# Patient Record
Sex: FEMALE | Race: OTHER | Employment: UNEMPLOYED | URBAN - METROPOLITAN AREA
[De-identification: names, ages, dates, MRNs, and addresses within clinical notes are randomized per-mention and may not be internally consistent; named-entity substitution may affect disease eponyms.]

---
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
## 2022-07-17 ENCOUNTER — HOSPITAL ENCOUNTER (EMERGENCY)
Facility: HOSPITAL | Age: 22
Discharge: HOME | End: 2022-07-17
Attending: EMERGENCY MEDICINE
Payer: MEDICAID

## 2022-07-17 VITALS
OXYGEN SATURATION: 99 % | HEART RATE: 89 BPM | SYSTOLIC BLOOD PRESSURE: 151 MMHG | TEMPERATURE: 98 F | DIASTOLIC BLOOD PRESSURE: 90 MMHG | RESPIRATION RATE: 16 BRPM | WEIGHT: 175 LBS | BODY MASS INDEX: 33 KG/M2

## 2022-07-17 DIAGNOSIS — H60.502 ACUTE OTITIS EXTERNA OF LEFT EAR, UNSPECIFIED TYPE: Primary | ICD-10-CM

## 2022-07-17 PROCEDURE — 99283 EMERGENCY DEPT VISIT LOW MDM: CPT

## 2022-07-17 RX ORDER — NEOMYCIN SULFATE, POLYMYXIN B SULFATE AND HYDROCORTISONE 10; 3.5; 1 MG/ML; MG/ML; [USP'U]/ML
4 SUSPENSION/ DROPS AURICULAR (OTIC) 4 TIMES DAILY
Qty: 10 ML | Refills: 0 | Status: SHIPPED | OUTPATIENT
Start: 2022-07-17

## 2022-07-17 RX ORDER — CEPHALEXIN 500 MG/1
500 CAPSULE ORAL 3 TIMES DAILY
Qty: 15 CAPSULE | Refills: 0 | Status: SHIPPED | OUTPATIENT
Start: 2022-07-17 | End: 2022-07-22

## 2022-07-20 ENCOUNTER — OFFICE VISIT (OUTPATIENT)
Dept: FAMILY MEDICINE CLINIC | Facility: CLINIC | Age: 22
End: 2022-07-20
Payer: MEDICAID

## 2022-07-20 VITALS
BODY MASS INDEX: 36.25 KG/M2 | HEIGHT: 61 IN | WEIGHT: 192 LBS | DIASTOLIC BLOOD PRESSURE: 77 MMHG | TEMPERATURE: 97 F | HEART RATE: 75 BPM | SYSTOLIC BLOOD PRESSURE: 125 MMHG

## 2022-07-20 DIAGNOSIS — H60.502 ACUTE OTITIS EXTERNA OF LEFT EAR, UNSPECIFIED TYPE: Primary | ICD-10-CM

## 2022-07-20 PROCEDURE — 3074F SYST BP LT 130 MM HG: CPT | Performed by: NURSE PRACTITIONER

## 2022-07-20 PROCEDURE — 3078F DIAST BP <80 MM HG: CPT | Performed by: NURSE PRACTITIONER

## 2022-07-20 PROCEDURE — 99213 OFFICE O/P EST LOW 20 MIN: CPT | Performed by: NURSE PRACTITIONER

## 2022-07-20 PROCEDURE — 3008F BODY MASS INDEX DOCD: CPT | Performed by: NURSE PRACTITIONER

## 2023-04-13 ENCOUNTER — OFFICE VISIT (OUTPATIENT)
Dept: FAMILY MEDICINE CLINIC | Facility: CLINIC | Age: 23
End: 2023-04-13

## 2023-04-13 VITALS
HEIGHT: 61 IN | BODY MASS INDEX: 40.22 KG/M2 | SYSTOLIC BLOOD PRESSURE: 133 MMHG | WEIGHT: 213 LBS | DIASTOLIC BLOOD PRESSURE: 77 MMHG | HEART RATE: 80 BPM

## 2023-04-13 DIAGNOSIS — N89.8 VAGINAL DISCHARGE: Primary | ICD-10-CM

## 2023-04-13 PROCEDURE — 3075F SYST BP GE 130 - 139MM HG: CPT | Performed by: NURSE PRACTITIONER

## 2023-04-13 PROCEDURE — 99213 OFFICE O/P EST LOW 20 MIN: CPT | Performed by: NURSE PRACTITIONER

## 2023-04-13 PROCEDURE — 3078F DIAST BP <80 MM HG: CPT | Performed by: NURSE PRACTITIONER

## 2023-04-13 PROCEDURE — 3008F BODY MASS INDEX DOCD: CPT | Performed by: NURSE PRACTITIONER

## 2023-04-14 LAB
C TRACH DNA SPEC QL NAA+PROBE: NEGATIVE
N GONORRHOEA DNA SPEC QL NAA+PROBE: NEGATIVE

## 2023-04-15 LAB
GENITAL VAGINOSIS SCREEN: NEGATIVE
TRICHOMONAS SCREEN: NEGATIVE

## 2024-07-30 ENCOUNTER — HOSPITAL ENCOUNTER (OUTPATIENT)
Age: 24
Discharge: HOME | End: 2024-07-30
Payer: MEDICAID

## 2024-07-30 VITALS
TEMPERATURE: 98 F | DIASTOLIC BLOOD PRESSURE: 60 MMHG | OXYGEN SATURATION: 98 % | HEART RATE: 78 BPM | SYSTOLIC BLOOD PRESSURE: 130 MMHG | RESPIRATION RATE: 18 BRPM

## 2024-07-30 DIAGNOSIS — S09.22XA: Primary | ICD-10-CM

## 2024-07-30 PROCEDURE — 99213 OFFICE O/P EST LOW 20 MIN: CPT | Performed by: NURSE PRACTITIONER

## 2024-07-30 RX ORDER — AMOXICILLIN AND CLAVULANATE POTASSIUM 875; 125 MG/1; MG/1
1 TABLET, FILM COATED ORAL 2 TIMES DAILY
Qty: 20 TABLET | Refills: 0 | Status: SHIPPED | OUTPATIENT
Start: 2024-07-30 | End: 2024-08-09

## 2024-07-30 RX ORDER — CIPROFLOXACIN AND DEXAMETHASONE 3; 1 MG/ML; MG/ML
4 SUSPENSION/ DROPS AURICULAR (OTIC) 2 TIMES DAILY
Qty: 7.5 ML | Refills: 0 | Status: SHIPPED | OUTPATIENT
Start: 2024-07-30 | End: 2024-08-09

## 2024-07-30 NOTE — DISCHARGE INSTRUCTIONS
As discussed, oral antibiotics twice a day for 10 days.  Antibiotic eardrops twice a day for 10 days.  Sleep somewhat elevated and upright.  Tylenol every 4 hours Motrin every 6 hours as needed for discomfort.  Avoid getting water in the ear: No tub baths or swimming.  Please follow-up with ENT specialist.

## 2024-07-30 NOTE — ED PROVIDER NOTES
Patient Seen in: [facility]      History     Chief Complaint   Patient presents with    Ear Pain     Stated Complaint: Ear Pain    Subjective: This is a 24-year-old female, no significant past medical history, [place]ian-speaking only, presents to immediate care clinic for left ear pain for 1 week.  Pain has become more severe and constant in nature.  Denies any trauma to ear.  No drainage or discharge.  Positive muffled hearing.  No recent or current viral symptoms.  No recent swimming.  No recent airplane travel.  No fever, chills, fatigue.  Well-appearing.  AOx4.  The history is provided by the patient. The history is limited by a language barrier. A  was used (465129).           Objective:   No pertinent past medical history.            No pertinent past surgical history.              No pertinent social history.            Review of Systems   Constitutional: Negative.  Negative for activity change, appetite change, chills, fatigue and fever.   HENT:  Positive for ear pain and hearing loss. Negative for congestion, ear discharge, postnasal drip, rhinorrhea, sinus pressure, sinus pain, sneezing, sore throat, tinnitus, trouble swallowing and voice change.    Respiratory: Negative.  Negative for cough and shortness of breath.    Cardiovascular: Negative.    Musculoskeletal: Negative.    Skin: Negative.    Neurological: Negative.  Negative for dizziness, weakness, light-headedness and headaches.       Positive for stated Chief Complaint: Ear Pain    Other systems are as noted in HPI.  Constitutional and vital signs reviewed.      All other systems reviewed and negative except as noted above.    Physical Exam     ED Triage Vitals [07/30/24 1038]   /60   Pulse 78   Resp 18   Temp 97.8 °F (36.6 °C)   Temp src Temporal   SpO2 98 %   O2 Device None (Room air)       Current Vitals:   Vital Signs  BP: 130/60  Pulse: 78  Resp: 18  Temp: 97.8 °F (36.6 °C)  Temp src: Temporal    Oxygen  Therapy  SpO2: 98 %  O2 Device: None (Room air)            Physical Exam  Constitutional:       General: She is not in acute distress.     Appearance: Normal appearance. She is not ill-appearing.   HENT:      Head: Normocephalic.      Right Ear: Tympanic membrane, ear canal and external ear normal.      Left Ear: Drainage and tenderness present. Tympanic membrane is perforated.      Ears:        Nose: Nose normal.      Mouth/Throat:      Mouth: Mucous membranes are moist.      Pharynx: Oropharynx is clear. No oropharyngeal exudate or posterior oropharyngeal erythema.   Eyes:      Extraocular Movements: Extraocular movements intact.      Pupils: Pupils are equal, round, and reactive to light.   Cardiovascular:      Rate and Rhythm: Normal rate and regular rhythm.      Pulses: Normal pulses.      Heart sounds: Normal heart sounds.   Pulmonary:      Effort: Pulmonary effort is normal. No respiratory distress.      Breath sounds: Normal breath sounds. No stridor. No wheezing, rhonchi or rales.   Chest:      Chest wall: No tenderness.   Musculoskeletal:         General: Normal range of motion.      Cervical back: Normal range of motion.   Lymphadenopathy:      Cervical: No cervical adenopathy.   Skin:     General: Skin is warm.      Capillary Refill: Capillary refill takes less than 2 seconds.   Neurological:      General: No focal deficit present.      Mental Status: She is alert and oriented to person, place, and time.               ED Course   Labs Reviewed - No data to display                   MDM      Differentials considered include: AOM, OME, cerumen impaction, foreign body, perforated or ruptured tympanic membrane.    Right TM visualized.  Good landmarks and light reflex.  No erythema, bulging, perforation, retraction.  No cerumen impaction.  No foreign body.  No effusion.    Left TM with perforation, positive drainage and discharge.  Unable to discernibly see exactly where perforation located, suspected at  between 7 and 12:00 margins.  Positive tenderness with exam.  Will prescribe both oral and antibiotic eardrops.    Discharge instructions given using .  Patient is aware to sleep somewhat elevated upright.  She is aware to avoid getting water in ear.  Tylenol and Motrin as needed for discomfort.    Patient is aware to follow-up with ENT specialist.  She verbalized understanding agrees with plan of care.                                   Medical Decision Making      Disposition and Plan     Clinical Impression:  1. Puncture wound of tympanic membrane, left, initial encounter         Disposition:  Discharge  7/30/2024 10:59 am    Follow-up:  Shiela Douglas APRN  [address]  [facility] [id number] [phone]      As needed    Shilo Conley MD  [address]  [facility] [id number] [phone]    Schedule an appointment as soon as possible for a visit   This is an Ear specialist you should follow up with          Medications Prescribed:  Discharge Medication List as of 7/30/2024 11:01 AM        START taking these medications    Details   amoxicillin clavulanate 875-125 MG Oral Tab Take 1 tablet by mouth 2 (two) times daily for 10 days., Normal, Disp-20 tablet, R-0      ciprofloxacin-dexamethasone 0.3-0.1 % Otic Suspension Place 4 drops into the left ear 2 (two) times daily for 10 days., Normal, Disp-7.5 mL, R-0

## 2024-08-15 ENCOUNTER — OFFICE VISIT (OUTPATIENT)
Dept: OTOLARYNGOLOGY | Facility: CLINIC | Age: 24
End: 2024-08-15

## 2024-08-15 DIAGNOSIS — H60.503 ACUTE OTITIS EXTERNA OF BOTH EARS, UNSPECIFIED TYPE: Primary | ICD-10-CM

## 2024-08-15 PROCEDURE — 69210 REMOVE IMPACTED EAR WAX UNI: CPT | Performed by: STUDENT IN AN ORGANIZED HEALTH CARE EDUCATION/TRAINING PROGRAM

## 2024-08-15 PROCEDURE — 99203 OFFICE O/P NEW LOW 30 MIN: CPT | Performed by: STUDENT IN AN ORGANIZED HEALTH CARE EDUCATION/TRAINING PROGRAM

## 2024-08-15 RX ORDER — ACETIC ACID 20.65 MG/ML
4 SOLUTION AURICULAR (OTIC) 3 TIMES DAILY
Qty: 1 EACH | Refills: 0 | Status: SHIPPED | OUTPATIENT
Start: 2024-08-15 | End: 2024-08-22

## 2024-08-15 NOTE — PROGRESS NOTES
Olive Vaughn is a 24 year old female.   Chief Complaint   Patient presents with    Ear Problem     Patient is here for last week ear pain patitn reports itchy right now patient reports ear clogged.     HPI:   24-year-old presents with bilateral ear itching and drainage and hearing loss    Current Outpatient Medications   Medication Sig Dispense Refill    acetic acid 2 % Otic Solution Place 4 drops into both ears 3 (three) times daily for 7 days. 1 each 0      History reviewed. No pertinent past medical history.   Social History:  Social History     Socioeconomic History    Marital status: Single   Tobacco Use    Smoking status: Never    Smokeless tobacco: Never   Vaping Use    Vaping status: Never Used   Substance and Sexual Activity    Alcohol use: Never    Drug use: Never     Social Determinants of Health      Received from [facility]      History reviewed. No pertinent surgical history.      EXAM:   There were no vitals taken for this visit.    System Details   Skin Inspection - Normal.   Constitutional Overall appearance - Normal.   Head/Face Symmetric, TMJ tenderness not present    Eyes EOMI, PERRL   Right ear:  Canal with gross fungal elements and ear canal edema   Left ear:  Canal with gross fungal elements and ear canal edema   Nose: Septum midline, inferior turbinates not enlarged, nasal valves without collapse    Oral cavity/Oropharynx: No lesions or masses on inspection or palpation, tonsils symmetric    Neck: Soft without LAD, thyroid not enlarged  Voice clear/ no stridor   Other:      SCOPES AND PROCEDURES:     Canals:  Left: Canal with cerumen preventing adequate view of TM, debrided with instrumentation  Right: Canal with cerumen preventing adequate view of TM, debrided with instrumentation    Tympanic Membranes:  Left: Normal tympanic membrane.   Right: Normal tympanic membrane.     TM Visualized Method:   Left TM examined via otomicroscopy.    Right TM examined via  otomicroscopy.      PROCEDURE:   Removal of cerumen impaction   The cerumen impaction was completely removed on the left and right sides using microscopy as necessary.   Removal was completed by using a curette and suction.     AUDIOGRAM AND IMAGING:         IMPRESSION:   1. Acute otitis externa of both ears, unspecified type       Recommendations:  -24-year-old with a bilateral fungal otitis externa  -Ears were debrided today  -She will begin acetic acid otic drops to both ears and discussed use these medications  -Dry ear precautions discussed and like to see her next week to assure resolution    The patient indicates understanding of these issues and agrees to the plan.  Consult from Dr Zuluaga regarding ear evaluation    Shilo Conley MD  8/15/2024  1:52 PM

## 2024-08-29 ENCOUNTER — OFFICE VISIT (OUTPATIENT)
Dept: OTOLARYNGOLOGY | Facility: CLINIC | Age: 24
End: 2024-08-29

## 2024-08-29 DIAGNOSIS — H61.23 BILATERAL IMPACTED CERUMEN: ICD-10-CM

## 2024-08-29 DIAGNOSIS — H60.503 ACUTE OTITIS EXTERNA OF BOTH EARS, UNSPECIFIED TYPE: Primary | ICD-10-CM

## 2024-08-29 PROCEDURE — 69210 REMOVE IMPACTED EAR WAX UNI: CPT | Performed by: STUDENT IN AN ORGANIZED HEALTH CARE EDUCATION/TRAINING PROGRAM

## 2024-08-29 PROCEDURE — 99213 OFFICE O/P EST LOW 20 MIN: CPT | Performed by: STUDENT IN AN ORGANIZED HEALTH CARE EDUCATION/TRAINING PROGRAM

## 2024-08-29 NOTE — PROGRESS NOTES
Olive Vaughn is a 24 year old female.   Chief Complaint   Patient presents with    Follow - Up     F/up acute otitis externa of both ears  Pt has been taking ear drops     HPI:   24-year-old in follow-up regarding her bilateral fungal otitis externa.  Feeling much better    No current outpatient medications on file.      History reviewed. No pertinent past medical history.   Social History:  Social History     Socioeconomic History    Marital status: Single   Tobacco Use    Smoking status: Never    Smokeless tobacco: Never   Vaping Use    Vaping status: Never Used   Substance and Sexual Activity    Alcohol use: Never    Drug use: Never     Social Determinants of Health      [facility]      History reviewed. No pertinent surgical history.      EXAM:   There were no vitals taken for this visit.    System Details   Skin Inspection - Normal.   Constitutional Overall appearance - Normal.   Head/Face Symmetric, TMJ tenderness not present    Eyes EOMI, PERRL   Right ear:  Canal wet cerumen preventing view of the tympanic membrane, TM intact, no MORGAN   Left ear:  Canal wet cerumen preventing view of the tympanic membrane, TM intact, no MORGAN   Nose: Septum midline, inferior turbinates not enlarged, nasal valves without collapse    Oral cavity/Oropharynx: No lesions or masses on inspection or palpation, tonsils symmetric    Neck: Soft without LAD, thyroid not enlarged  Voice clear/ no stridor   Other:      SCOPES AND PROCEDURES:     Canals:  Left: Canal with wet cerumen preventing adequate view of TM, debrided with instrumentation  Right: Canal with wet cerumen preventing adequate view of TM, debrided with instrumentation    Tympanic Membranes:  Left: Normal tympanic membrane.   Right: Normal tympanic membrane.     TM Visualized Method:   Left TM examined via otomicroscopy.    Right TM examined via otomicroscopy.      PROCEDURE:   Removal of cerumen impaction   The cerumen impaction  was completely removed on the left and right sides using microscopy as necessary.   Removal was completed by using a curette and suction.     AUDIOGRAM AND IMAGING:         IMPRESSION:   1. Acute otitis externa of both ears, unspecified type    2. Bilateral impacted cerumen       Recommendations:  -Improved bilaterally.  Residual wet cerumen debrided.  -Can discontinue otic drops and let her ears dry out  -Return precautions discussed    The patient indicates understanding of these issues and agrees to the plan.      Shilo Conley MD  8/29/2024  1:09 PM

## 2024-10-21 NOTE — PROGRESS NOTES
Subjective:   Olive Vaughn is a 24 year old female who presents for Physical (Lab order, (Accepted Influenza Vaccine Today))   Patient is a pleasant 24-year-old female with no significant past medical history.  Patient presents to office today new to this provider to obtain physical.  Patient states via [facility] [id number] her health is ok.     Diet: Not good. Wants to eat more vegetables. She eats out on occasion.   Exercise: she does not exercise. Educated on recommendations.  Sleep: can be an issue. She works nights, she sleeps well during the day. However, she tries to sleep at night on weekends.   Stress: she can get stresses at times. Likes to listen to music.   Social: dating boys, has 2 kids, lives in [place], rents home  Sexually Active: yes  Prophylaxis: not right now, educated at risk for pregnancy. LMP 10/18/24.   Alcohol: on occasion  Tobacco: no  Work: works at [employer]   Vaccinations: N/A  PAP: needs to schedule            History reviewed. No pertinent past medical history.   History reviewed. No pertinent surgical history.     History/Other:    Chief Complaint Reviewed and Verified  Nursing Notes Reviewed and   Verified  Tobacco Reviewed  Allergies Reviewed  Medications Reviewed    Problem List Reviewed  Medical History Reviewed  Surgical History   Reviewed  OB Status Reviewed  Family History Reviewed  Social History   Reviewed         Tobacco:  She has never smoked tobacco.    Current Outpatient Medications   Medication Sig Dispense Refill    naproxen 250 MG Oral Tab Take 1 tablet (250 mg total) by mouth 2 (two) times daily with meals. 30 tablet 0    clotrimazole 1 % External Cream Apply 1 Application topically 2 (two) times daily for 14 days. 60 g 0         Review of Systems:  Review of Systems   Constitutional: Negative.  Negative for activity change, appetite change, chills and fever.   HENT: Negative.  Negative for congestion, postnasal drip, rhinorrhea, sore  throat, tinnitus and voice change.         Narrow oropharynx   Eyes: Negative.    Respiratory: Negative.  Negative for apnea, cough, chest tightness and shortness of breath.    Cardiovascular:  Negative for chest pain and leg swelling.   Gastrointestinal: Negative.  Negative for abdominal pain, anal bleeding, blood in stool, constipation, diarrhea, nausea and vomiting.   Genitourinary: Negative.  Negative for difficulty urinating, flank pain and menstrual problem.   Musculoskeletal: Negative.  Negative for joint swelling.   Skin: Negative.    Neurological: Negative.  Negative for dizziness and headaches.   Psychiatric/Behavioral: Negative.  Negative for agitation.          Objective:   /68 (BP Location: Left arm, Patient Position: Sitting, Cuff Size: large)   Pulse 75   Ht 5' 1\" (1.549 m)   Wt 232 lb 6.4 oz (105.4 kg)   LMP 10/18/2024   SpO2 98%   BMI 43.91 kg/m²  Estimated body mass index is 43.91 kg/m² as calculated from the following:    Height as of this encounter: 5' 1\" (1.549 m).    Weight as of this encounter: 232 lb 6.4 oz (105.4 kg).  Physical Exam  Vitals and nursing note reviewed.   Constitutional:       General: She is not in acute distress.     Appearance: Normal appearance. She is well-developed. She is obese.   HENT:      Head: Normocephalic and atraumatic.      Right Ear: Tympanic membrane, ear canal and external ear normal.      Left Ear: Tympanic membrane, ear canal and external ear normal.      Nose: Nose normal. No congestion or rhinorrhea.      Mouth/Throat:      Mouth: Mucous membranes are moist.      Pharynx: Oropharynx is clear.   Eyes:      Conjunctiva/sclera: Conjunctivae normal.      Pupils: Pupils are equal, round, and reactive to light.   Neck:      Thyroid: No thyromegaly.   Cardiovascular:      Rate and Rhythm: Normal rate and regular rhythm.      Pulses: Normal pulses.      Heart sounds: Normal heart sounds. No murmur heard.  Pulmonary:      Effort: Pulmonary effort is  normal. No respiratory distress.      Breath sounds: Normal breath sounds. No wheezing or rales.   Chest:      Chest wall: No tenderness.   Abdominal:      General: Bowel sounds are normal. There is no distension.      Palpations: Abdomen is soft.      Tenderness: There is no abdominal tenderness.   Musculoskeletal:         General: No tenderness. Normal range of motion.      Cervical back: Normal range of motion and neck supple.   Feet:      Comments: Tinea pedis right 4-5 digit  Lymphadenopathy:      Cervical: No cervical adenopathy.   Skin:     General: Skin is warm and dry.      Capillary Refill: Capillary refill takes less than 2 seconds.      Findings: No rash.   Neurological:      Mental Status: She is alert and oriented to person, place, and time.      Coordination: Coordination normal.   Psychiatric:         Behavior: Behavior normal.         Thought Content: Thought content normal.         Judgment: Judgment normal.           Assessment & Plan:   1. Encounter for wellness examination in adult (Primary)  -     Hemoglobin A1C; Future; Expected date: 10/22/2024  -     CBC With Differential With Platelet; Future; Expected date: 10/22/2024  -     Comp Metabolic Panel (14); Future; Expected date: 10/22/2024  -     TSH W Reflex To Free T4; Future; Expected date: 10/22/2024  -     Lipid Panel; Future; Expected date: 10/22/2024  2. Need for immunization against influenza  -     Fluzone trivalent vaccine, PF 0.5mL, 6mo+ (87007)  3. Obesity, unspecified class, unspecified obesity type, unspecified whether serious comorbidity present  -     Diagnostic Sleep Study  4. MARK (obstructive sleep apnea)  -     Diagnostic Sleep Study  -     General sleep study; Future; Expected date: 11/22/2024  5. Tinea pedis of right foot  -     Clotrimazole; Apply 1 Application topically 2 (two) times daily for 14 days.  Dispense: 60 g; Refill: 0  6. Other migraine without status migrainosus, not intractable  -     Naproxen; Take 1 tablet  (250 mg total) by mouth 2 (two) times daily with meals.  Dispense: 30 tablet; Refill: 0    1. Encounter for wellness examination in adult  Wellness labs ordered.  Encouraged increasing physical activity which includes raising heart rate 3 to 5 days/week for at least 30 minutes at a time, while also performing mild strength exercises.  Patient counseled on importance of dietary modifications, which may include limiting fats, red meat, and carbohydrates, while increasing fruit and vegetable intake, and trying to adhere to a low sodium/Mediterranean diet.  Encouraged to manage stress.  Encouraged good sleep habits.  Encouraged good sexual health.    - Hemoglobin A1C; Future  - CBC With Differential With Platelet; Future  - Comp Metabolic Panel (14); Future  - TSH W Reflex To Free T4; Future  - Lipid Panel; Future    2. Need for immunization against influenza  Flu vaccine in office  - Fluzone trivalent vaccine, PF 0.5mL, 6mo+ (63381)    3. Obesity, unspecified class, unspecified obesity type, unspecified whether serious comorbidity present  BMI in office 43.91  Check lipids  Check A1c  Encourage lifestyle changes  Educated on recommendation on exercise  - DIAGOSTIC SLEEP STUDY    4. MARK (obstructive sleep apnea)  STOPBANG score 5  Reported period of apnea  - DIAGOSTIC SLEEP STUDY  - General sleep study; Future    5. Tinea pedis of right foot  Practices aimed at minimizing moisture and friction in the involved area and reducing susceptibility to intertrigo are the mainstays of treatment. Typical beneficial practices include:  -Daily cleansing of skin with a mild cleanser followed by drying of area completely  -Aeration of affected area when feasible  -Daily application of drying powders, such as powders composed of microporous cellulose  -Use of absorbent material or clothing, such as cotton or alfaro wool, to separate skin in folds  -Weight loss in persons who are overweight or obese  -Use Topical cream as prescribed.      - clotrimazole 1 % External Cream; Apply 1 Application topically 2 (two) times daily for 14 days.  Dispense: 60 g; Refill: 0    6. Other migraine without status migrainosus, not intractable  Unilateral headache  Pulsatile  Recurrent   Associated nausea  Prefers to rest  Start naproxen BID PRN  Edcuated HA diary and prevention techniques at home  Can escalate therapy if needed  - naproxen 250 MG Oral Tab; Take 1 tablet (250 mg total) by mouth 2 (two) times daily with meals.  Dispense: 30 tablet; Refill: 0    Patient aware of plan of care. All questions answered to satisfaction of the patient. Patient instructed to call office or reach out via [employer] if any issues arise. For urgent issues and/or reviewed red flags please proceed to the urgent care or ER.  Also, inform the nurse practitioner with any new symptoms or medication side effects.        Return if symptoms worsen or fail to improve.    Eduard Romo, APRN, 10/21/2024, 1:17 PM

## 2024-10-22 ENCOUNTER — LAB ENCOUNTER (OUTPATIENT)
Dept: LAB | Age: 24
End: 2024-10-22
Payer: MEDICAID

## 2024-10-22 ENCOUNTER — MED REC SCAN ONLY (OUTPATIENT)
Dept: FAMILY MEDICINE CLINIC | Facility: CLINIC | Age: 24
End: 2024-10-22

## 2024-10-22 ENCOUNTER — OFFICE VISIT (OUTPATIENT)
Dept: FAMILY MEDICINE CLINIC | Facility: CLINIC | Age: 24
End: 2024-10-22

## 2024-10-22 VITALS
OXYGEN SATURATION: 98 % | BODY MASS INDEX: 43.88 KG/M2 | SYSTOLIC BLOOD PRESSURE: 120 MMHG | WEIGHT: 232.38 LBS | HEART RATE: 75 BPM | HEIGHT: 61 IN | DIASTOLIC BLOOD PRESSURE: 68 MMHG

## 2024-10-22 DIAGNOSIS — G43.809 OTHER MIGRAINE WITHOUT STATUS MIGRAINOSUS, NOT INTRACTABLE: ICD-10-CM

## 2024-10-22 DIAGNOSIS — Z23 NEED FOR IMMUNIZATION AGAINST INFLUENZA: ICD-10-CM

## 2024-10-22 DIAGNOSIS — Z00.00 ENCOUNTER FOR WELLNESS EXAMINATION IN ADULT: Primary | ICD-10-CM

## 2024-10-22 DIAGNOSIS — G47.33 OSA (OBSTRUCTIVE SLEEP APNEA): ICD-10-CM

## 2024-10-22 DIAGNOSIS — E66.9 OBESITY, UNSPECIFIED CLASS, UNSPECIFIED OBESITY TYPE, UNSPECIFIED WHETHER SERIOUS COMORBIDITY PRESENT: ICD-10-CM

## 2024-10-22 DIAGNOSIS — Z00.00 ENCOUNTER FOR WELLNESS EXAMINATION IN ADULT: ICD-10-CM

## 2024-10-22 DIAGNOSIS — B35.3 TINEA PEDIS OF RIGHT FOOT: ICD-10-CM

## 2024-10-22 PROBLEM — A74.9 CHLAMYDIA: Status: ACTIVE | Noted: 2019-12-10

## 2024-10-22 LAB
ALBUMIN SERPL-MCNC: 4.5 G/DL (ref 3.2–4.8)
ALBUMIN/GLOB SERPL: 1.5 {RATIO} (ref 1–2)
ALP LIVER SERPL-CCNC: 82 U/L
ALT SERPL-CCNC: 86 U/L
ANION GAP SERPL CALC-SCNC: 5 MMOL/L (ref 0–18)
AST SERPL-CCNC: 37 U/L (ref ?–34)
BASOPHILS # BLD AUTO: 0.04 X10(3) UL (ref 0–0.2)
BASOPHILS NFR BLD AUTO: 0.4 %
BILIRUB SERPL-MCNC: 0.6 MG/DL (ref 0.3–1.2)
BUN BLD-MCNC: 16 MG/DL (ref 9–23)
BUN/CREAT SERPL: 26.2 (ref 10–20)
CALCIUM BLD-MCNC: 9.6 MG/DL (ref 8.7–10.4)
CHLORIDE SERPL-SCNC: 109 MMOL/L (ref 98–112)
CHOLEST SERPL-MCNC: 169 MG/DL (ref ?–200)
CO2 SERPL-SCNC: 26 MMOL/L (ref 21–32)
CREAT BLD-MCNC: 0.61 MG/DL
DEPRECATED RDW RBC AUTO: 42.9 FL (ref 35.1–46.3)
EGFRCR SERPLBLD CKD-EPI 2021: 130 ML/MIN/1.73M2 (ref 60–?)
EOSINOPHIL # BLD AUTO: 0.25 X10(3) UL (ref 0–0.7)
EOSINOPHIL NFR BLD AUTO: 2.6 %
ERYTHROCYTE [DISTWIDTH] IN BLOOD BY AUTOMATED COUNT: 14.1 % (ref 11–15)
FASTING PATIENT LIPID ANSWER: YES
FASTING STATUS PATIENT QL REPORTED: YES
GLOBULIN PLAS-MCNC: 3.1 G/DL (ref 2–3.5)
GLUCOSE BLD-MCNC: 108 MG/DL (ref 70–99)
HCT VFR BLD AUTO: 35.6 %
HDLC SERPL-MCNC: 36 MG/DL (ref 40–59)
HGB BLD-MCNC: 12 G/DL
IMM GRANULOCYTES # BLD AUTO: 0.02 X10(3) UL (ref 0–1)
IMM GRANULOCYTES NFR BLD: 0.2 %
LDLC SERPL CALC-MCNC: 99 MG/DL (ref ?–100)
LYMPHOCYTES # BLD AUTO: 2.44 X10(3) UL (ref 1–4)
LYMPHOCYTES NFR BLD AUTO: 25.7 %
MCH RBC QN AUTO: 28.2 PG (ref 26–34)
MCHC RBC AUTO-ENTMCNC: 33.7 G/DL (ref 31–37)
MCV RBC AUTO: 83.6 FL
MONOCYTES # BLD AUTO: 0.71 X10(3) UL (ref 0.1–1)
MONOCYTES NFR BLD AUTO: 7.5 %
NEUTROPHILS # BLD AUTO: 6.03 X10 (3) UL (ref 1.5–7.7)
NEUTROPHILS # BLD AUTO: 6.03 X10(3) UL (ref 1.5–7.7)
NEUTROPHILS NFR BLD AUTO: 63.6 %
NONHDLC SERPL-MCNC: 133 MG/DL (ref ?–130)
OSMOLALITY SERPL CALC.SUM OF ELEC: 292 MOSM/KG (ref 275–295)
PLATELET # BLD AUTO: 351 10(3)UL (ref 150–450)
POTASSIUM SERPL-SCNC: 4 MMOL/L (ref 3.5–5.1)
PROT SERPL-MCNC: 7.6 G/DL (ref 5.7–8.2)
RBC # BLD AUTO: 4.26 X10(6)UL
SODIUM SERPL-SCNC: 140 MMOL/L (ref 136–145)
TRIGL SERPL-MCNC: 196 MG/DL (ref 30–149)
TSI SER-ACNC: 2.03 MIU/ML (ref 0.55–4.78)
VLDLC SERPL CALC-MCNC: 33 MG/DL (ref 0–30)
WBC # BLD AUTO: 9.5 X10(3) UL (ref 4–11)

## 2024-10-22 PROCEDURE — 36415 COLL VENOUS BLD VENIPUNCTURE: CPT

## 2024-10-22 PROCEDURE — 85025 COMPLETE CBC W/AUTO DIFF WBC: CPT

## 2024-10-22 PROCEDURE — 80061 LIPID PANEL: CPT

## 2024-10-22 PROCEDURE — 80053 COMPREHEN METABOLIC PANEL: CPT

## 2024-10-22 PROCEDURE — 83036 HEMOGLOBIN GLYCOSYLATED A1C: CPT

## 2024-10-22 PROCEDURE — 90471 IMMUNIZATION ADMIN: CPT

## 2024-10-22 PROCEDURE — 90656 IIV3 VACC NO PRSV 0.5 ML IM: CPT

## 2024-10-22 PROCEDURE — 99395 PREV VISIT EST AGE 18-39: CPT

## 2024-10-22 PROCEDURE — 84443 ASSAY THYROID STIM HORMONE: CPT

## 2024-10-22 RX ORDER — NAPROXEN 250 MG/1
250 TABLET ORAL EVERY 12 HOURS PRN
Qty: 30 TABLET | Refills: 0 | Status: SHIPPED | OUTPATIENT
Start: 2024-10-22

## 2024-10-22 RX ORDER — CLOTRIMAZOLE 1 %
1 CREAM (GRAM) TOPICAL 2 TIMES DAILY
Qty: 60 G | Refills: 0 | Status: SHIPPED | OUTPATIENT
Start: 2024-10-22 | End: 2024-11-05

## 2024-10-22 RX ORDER — NAPROXEN 250 MG/1
250 TABLET ORAL 2 TIMES DAILY WITH MEALS
Qty: 30 TABLET | Refills: 0 | Status: SHIPPED | OUTPATIENT
Start: 2024-10-22 | End: 2024-10-22

## 2024-10-23 DIAGNOSIS — R73.03 PREDIABETES: ICD-10-CM

## 2024-10-23 DIAGNOSIS — R74.8 ELEVATED LIVER ENZYMES: Primary | ICD-10-CM

## 2024-10-23 DIAGNOSIS — E78.1 HYPERTRIGLYCERIDEMIA: ICD-10-CM

## 2024-10-23 LAB
EST. AVERAGE GLUCOSE BLD GHB EST-MCNC: 123 MG/DL (ref 68–126)
HBA1C MFR BLD: 5.9 % (ref ?–5.7)

## 2024-10-23 NOTE — PROGRESS NOTES
1. Elevated liver enzymes  Liver enzymes elevated on metabolic panel with previous labs.  ALT 86, AST 37.  Likely fatty liver  Encouraged diet and exercise.  Recheck CMP 6 months  - Comp Metabolic Panel (14) [E]; Future    2. Prediabetes  A1c on wellness labs 5.9  New diagnosis prediabetes  Encouraged lifestyle modification with weight loss and exercise  Recheck A1c 1 year  - Comp Metabolic Panel (14) [E]; Future    3. Hypertriglyceridemia  Triglycerides 196 on wellness labs  Advised increased healthy fats and fiber  Recheck cholesterol 1 year    TATIANA Lange

## 2024-10-24 NOTE — PROGRESS NOTES
1st Attempt:  10/24 L/vm to call back also advised to check mychart for results     Boogie hemograma completo resultó normal, sin problemas de anemia o infección.  Boogie panel metabólico volvió a la normalidad, sin problemas con los electrolitos o los riñones.  Christal leve congestión en el hígado fomentará cierta pérdida de peso y volverá a controlarlo en 6 meses. Tu prueba de tiroides resultó normal.  Boogie A1c o nivel de azúcar a riya plazo para la diabetes volvió a estar ligeramente elevado en 5,9. Lynn número indica prediabetes. Sugeriría hacer cambios en la dieta y aumentar el ejercicio. Nos gustaría reducir el consumo de carbohidratos magy pizza, pasta, tortillas y pan. Elimine los azúcares simples magy dulces, refrescos o helados. Aumente el ejercicio de 3 a 5 veces por semana puneet al menos 30 minutos. Volveremos a controlar boogie nivel de azúcar en un año. Boogie colesterol en general era euceda. Leve descenso del HDL o colesterol euceda y aumento de los triglicéridos. Sugeriría aumentar la fibra y las grasas saludables y lo volveremos a comprobar en un año.

## 2024-10-25 NOTE — PROGRESS NOTES
2nd Attempt:  10/25  L/vm to call back also advised to check mychart for results    Boogie hemograma completo resultó normal, sin problemas de anemia o infección.  Boogie panel metabólico volvió a la normalidad, sin problemas con los electrolitos o los riñones.  Christal leve congestión en el hígado fomentará cierta pérdida de peso y volverá a controlarlo en 6 meses. Tu prueba de tiroides resultó normal.  Boogie A1c o nivel de azúcar a riya plazo para la diabetes volvió a estar ligeramente elevado en 5,9. Lynn número indica prediabetes. Sugeriría hacer cambios en la dieta y aumentar el ejercicio. Nos gustaría reducir el consumo de carbohidratos magy pizza, pasta, tortillas y pan. Elimine los azúcares simples magy dulces, refrescos o helados. Aumente el ejercicio de 3 a 5 veces por semana puneet al menos 30 minutos. Volveremos a controlar boogie nivel de azúcar en un año. Boogie colesterol en general era euceda. Leve descenso del HDL o colesterol euceda y aumento de los triglicéridos. Sugeriría aumentar la fibra y las grasas saludables y lo volveremos a comprobar en un año.

## 2024-10-28 ENCOUNTER — TELEPHONE (OUTPATIENT)
Dept: FAMILY MEDICINE CLINIC | Facility: CLINIC | Age: 24
End: 2024-10-28

## 2024-10-28 NOTE — PROGRESS NOTES
3rd Attempt:  10/28  L/vm to call back also advised to check mychart for results    Boogie hemograma completo resultó normal, sin problemas de anemia o infección.  Boogie panel metabólico volvió a la normalidad, sin problemas con los electrolitos o los riñones.  Christal leve congestión en el hígado fomentará cierta pérdida de peso y volverá a controlarlo en 6 meses. Tu prueba de tiroides resultó normal.  Boogie A1c o nivel de azúcar a riya plazo para la diabetes volvió a estar ligeramente elevado en 5,9. Lynn número indica prediabetes. Sugeriría hacer cambios en la dieta y aumentar el ejercicio. Nos gustaría reducir el consumo de carbohidratos magy pizza, pasta, tortillas y pan. Elimine los azúcares simples magy dulces, refrescos o helados. Aumente el ejercicio de 3 a 5 veces por semana puneet al menos 30 minutos. Volveremos a controlar boogie nivel de azúcar en un año. Boogie colesterol en general era euceda. Leve descenso del HDL o colesterol euceda y aumento de los triglicéridos. Sugeriría aumentar la fibra y las grasas saludables y lo volveremos a comprobar en un año.

## 2024-10-28 NOTE — TELEPHONE ENCOUNTER
Patient called back for test results     Patient contacted (name and date of birth verified). Provider's results and recommendations reviewed with patient. Patient verbalizes understanding of the information, agrees with plan of care and offers no further questions at this time.   Azul Burciaga MA  10/28/2024 11:15 AM CDT Back to Top      3rd Attempt:  10/28  L/vm to call back also advised to check mychart for results     Boogie hemograma completo resultó normal, sin problemas de anemia o infección.  Boogie panel metabólico volvió a la normalidad, sin problemas con los electrolitos o los riñones.  Christal leve congestión en el hígado fomentará cierta pérdida de peso y volverá a controlarlo en 6 meses. Tu prueba de tiroides resultó normal.  Boogie A1c o nivel de azúcar a riya plazo para la diabetes volvió a estar ligeramente elevado en 5,9. Lynn número indica prediabetes. Sugeriría hacer cambios en la dieta y aumentar el ejercicio. Nos gustaría reducir el consumo de carbohidratos magy pizza, pasta, tortillas y pan. Elimine los azúcares simples magy dulces, refrescos o helados. Aumente el ejercicio de 3 a 5 veces por semana puneet al menos 30 minutos. Volveremos a controlar boogie nivel de azúcar en un año. Boogie colesterol en general era euceda. Leve descenso del HDL o colesterol euceda y aumento de los triglicéridos. Sugeriría aumentar la fibra y las grasas saludables y lo volveremos a comprobar en un año.    Azul Burciaga MA  10/25/2024  3:13 PM CDT       2nd Attempt:  10/25  L/vm to call back also advised to check mychart for results     Boogie hemograma completo resultó normal, sin problemas de anemia o infección.  Boogie panel metabólico volvió a la normalidad, sin problemas con los electrolitos o los riñones.  Christal leve congestión en el hígado fomentará cierta pérdida de peso y volverá a controlarlo en 6 meses. Tu prueba de tiroides resultó normal.  Boogie A1c o nivel de azúcar a riya plazo para la diabetes volvió a estar ligeramente  elevado en 5,9. Lynn número indica prediabetes. Sugeriría hacer cambios en la dieta y aumentar el ejercicio. Nos gustaría reducir el consumo de carbohidratos magy pizza, pasta, tortillas y pan. Elimine los azúcares simples magy dulces, refrescos o helados. Aumente el ejercicio de 3 a 5 veces por semana puneet al menos 30 minutos. Volveremos a controlar luong nivel de azúcar en un año. Luong colesterol en general era euceda. Leve descenso del HDL o colesterol euceda y aumento de los triglicéridos. Sugeriría aumentar la fibra y las grasas saludables y lo volveremos a comprobar en un año.    Azul Burciaga MA  10/24/2024  9:19 AM CDT       1st Attempt:  10/24 L/vm to call back also advised to check mychart for results     Luong hemograma completo resultó normal, sin problemas de anemia o infección.  Luong panel metabólico volvió a la normalidad, sin problemas con los electrolitos o los riñones.  Christal leve congestión en el hígado fomentará cierta pérdida de peso y volverá a controlarlo en 6 meses. Tu prueba de tiroides resultó normal.  Luong A1c o nivel de azúcar a riya plazo para la diabetes volvió a estar ligeramente elevado en 5,9. Lynn número indica prediabetes. Sugeriría hacer cambios en la dieta y aumentar el ejercicio. Nos gustaría reducir el consumo de carbohidratos magy pizza, pasta, tortillas y pan. Elimine los azúcares simples magy dulces, refrescos o helados. Aumente el ejercicio de 3 a 5 veces por semana puneet al menos 30 minutos. Volveremos a controlar luong nivel de azúcar en un año. Luong colesterol en general era euceda. Leve descenso del HDL o colesterol euceda y aumento de los triglicéridos. Sugeriría aumentar la fibra y las grasas saludables y lo volveremos a comprobar en un año.    Eduard Romo, APRN  10/23/2024  5:22 PM CDT       Please call Korean-speaking patient and let her know. Your wellness labs have resulted.  Your complete blood count came back normal no issues with anemia or infection.  Your metabolic  panel came back normal no issues with electrolytes or kidneys.  Some mild congestion in liver, will encourage some weight loss and recheck in 6 months. Your thyroid test came back normal.  Your A1c or long-range sugar for diabetes came back slightly elevated at 5.9. This number indicates prediabetes. I would suggest making changes in diet, and increasing exercise. We would like to cut back on carbohydrates like pizza, pasta, tortillas, and bread. Eliminate simple sugars like candy, pop, or ice cream. Increase exercise to 3 to 5 times per week for at least 30 minutes. We will recheck your sugar in one year. Your cholesterol was overall good. Mild decrease in HDL or good cholesterol and increased triglycerides. Would suggest increasing fiber and healthy fats and we will recheck in one year. It was a pleasure meeting you! Keep up the great work!!     Basilio,     Eduard

## 2024-11-05 ENCOUNTER — OFFICE VISIT (OUTPATIENT)
Dept: FAMILY MEDICINE CLINIC | Facility: CLINIC | Age: 24
End: 2024-11-05

## 2024-11-05 VITALS
HEART RATE: 79 BPM | BODY MASS INDEX: 44.06 KG/M2 | HEIGHT: 61 IN | SYSTOLIC BLOOD PRESSURE: 120 MMHG | DIASTOLIC BLOOD PRESSURE: 70 MMHG | WEIGHT: 233.38 LBS

## 2024-11-05 DIAGNOSIS — Z12.4 SCREENING FOR CERVICAL CANCER: Primary | ICD-10-CM

## 2024-11-05 NOTE — PROGRESS NOTES
HPI    Patient presents for Pap only.  Recently had on 10/22.      Review of Systems   All other systems reviewed and are negative.       Vitals:    11/05/24 1142   BP: 120/70   Pulse: 79   Weight: 233 lb 6.4 oz (105.9 kg)   Height: 5' 1\" (1.549 m)     Body mass index is 44.1 kg/m².    Health Maintenance   Topic Date Due    Pap Smear  Never done    COVID-19 Vaccine (1 - 2023-24 season) Never done    Annual Physical  10/22/2025    DTaP,Tdap,and Td Vaccines (6 - Td or Tdap) 05/12/2026    Influenza Vaccine  Completed    Annual Depression Screening  Completed    HPV Vaccines  Completed    Pneumococcal Vaccine: Birth to 64yrs  Aged Out       Patient's last menstrual period was 10/18/2024.    History reviewed. No pertinent past medical history.    .History reviewed. No pertinent surgical history.    History reviewed. No pertinent family history.    Social History     Socioeconomic History    Marital status: Single     Spouse name: Not on file    Number of children: Not on file    Years of education: Not on file    Highest education level: Not on file   Occupational History    Not on file   Tobacco Use    Smoking status: Never     Passive exposure: Never    Smokeless tobacco: Never   Vaping Use    Vaping status: Never Used   Substance and Sexual Activity    Alcohol use: Never    Drug use: Never    Sexual activity: Not on file   Other Topics Concern    Not on file   Social History Narrative    Not on file     Social Drivers of Health     Financial Resource Strain: Not on file   Food Insecurity: Not on file   Transportation Needs: Not on file   Physical Activity: Not on file   Stress: Not on file   Social Connections: Not on file   Housing Stability: At Risk (8/18/2023)    Received from [facility]     Living Situation: Not on file     Housing Problems: Not on file       Current Outpatient Medications   Medication Sig Dispense Refill    clotrimazole 1 % External Cream Apply 1 Application topically 2 (two)  times daily for 14 days. 60 g 0    naproxen 250 MG Oral Tab Take 1 tablet (250 mg total) by mouth every 12 (twelve) hours as needed (migraine HA). 30 tablet 0       Allergies:  Allergies[1]    Physical Exam  Vitals and nursing note reviewed.   Constitutional:       General: She is not in acute distress.     Appearance: Normal appearance. She is not ill-appearing.   Cardiovascular:      Heart sounds: No murmur heard.  Chest:   Breasts:     Right: Normal.      Left: Normal.   Genitourinary:     Exam position: Lithotomy position.      Labia:         Right: No rash, tenderness, lesion or injury.         Left: No rash, tenderness, lesion or injury.    Neurological:      Mental Status: She is alert and oriented to person, place, and time.          Assessment and Plan:  Problem List Items Addressed This Visit    None  Visit Diagnoses       Screening for cervical cancer    -  Primary    Relevant Orders    ThinPrep PAP Smear    Hpv Dna  High Risk , Thin Prep Collect                Discussed plan of care with patient and patient is in agreement.  All questions answered. Patient to call with questions or concerns.    Encouraged to sign up for My Chart if not already registered.        [1] No Known Allergies

## 2024-11-06 LAB — HPV E6+E7 MRNA CVX QL NAA+PROBE: NEGATIVE
